# Patient Record
Sex: MALE | Race: WHITE | Employment: FULL TIME | URBAN - NONMETROPOLITAN AREA
[De-identification: names, ages, dates, MRNs, and addresses within clinical notes are randomized per-mention and may not be internally consistent; named-entity substitution may affect disease eponyms.]

---

## 2019-02-03 VITALS
OXYGEN SATURATION: 99 % | WEIGHT: 142 LBS | RESPIRATION RATE: 18 BRPM | HEIGHT: 71 IN | BODY MASS INDEX: 19.88 KG/M2 | SYSTOLIC BLOOD PRESSURE: 125 MMHG | DIASTOLIC BLOOD PRESSURE: 75 MMHG | TEMPERATURE: 97.9 F | HEART RATE: 68 BPM

## 2019-02-03 ASSESSMENT — PAIN DESCRIPTION - ORIENTATION: ORIENTATION: MID

## 2019-02-03 ASSESSMENT — PAIN SCALES - GENERAL: PAINLEVEL_OUTOF10: 10

## 2019-02-03 ASSESSMENT — PAIN DESCRIPTION - LOCATION: LOCATION: PELVIS

## 2019-02-04 ENCOUNTER — HOSPITAL ENCOUNTER (EMERGENCY)
Age: 39
Discharge: HOME | End: 2019-02-04

## 2019-02-04 DIAGNOSIS — L73.1 INGROWN HAIR: ICD-10-CM

## 2019-02-04 DIAGNOSIS — L02.91 ABSCESS: Primary | ICD-10-CM

## 2019-02-04 PROCEDURE — 99282 EMERGENCY DEPT VISIT SF MDM: CPT

## 2019-02-04 PROCEDURE — 10060 I&D ABSCESS SIMPLE/SINGLE: CPT

## 2019-02-04 PROCEDURE — 99283 EMERGENCY DEPT VISIT LOW MDM: CPT | Performed by: NURSE PRACTITIONER

## 2019-02-04 RX ORDER — MUPIROCIN CALCIUM 20 MG/G
CREAM TOPICAL
Qty: 15 G | Refills: 0 | Status: SHIPPED | OUTPATIENT
Start: 2019-02-04 | End: 2019-03-06

## 2019-02-04 RX ORDER — MORPHINE SULFATE/0.9% NACL/PF 1 MG/ML
4 SYRINGE (ML) INJECTION ONCE
Status: DISCONTINUED | OUTPATIENT
Start: 2019-02-04 | End: 2019-02-04

## 2019-02-04 RX ORDER — AMOXICILLIN AND CLAVULANATE POTASSIUM 875; 125 MG/1; MG/1
1 TABLET, FILM COATED ORAL 2 TIMES DAILY
Qty: 14 TABLET | Refills: 0 | Status: SHIPPED | OUTPATIENT
Start: 2019-02-04 | End: 2019-02-04 | Stop reason: CLARIF

## 2019-02-04 RX ORDER — ONDANSETRON 4 MG/1
4 TABLET, ORALLY DISINTEGRATING ORAL ONCE
Status: DISCONTINUED | OUTPATIENT
Start: 2019-02-04 | End: 2019-02-04

## 2019-02-04 RX ORDER — SULFAMETHOXAZOLE AND TRIMETHOPRIM 800; 160 MG/1; MG/1
1 TABLET ORAL 2 TIMES DAILY
Qty: 20 TABLET | Refills: 0 | Status: SHIPPED | OUTPATIENT
Start: 2019-02-04 | End: 2019-02-14

## 2019-02-04 ASSESSMENT — ENCOUNTER SYMPTOMS
VOMITING: 0
NAUSEA: 0

## 2025-04-04 ENCOUNTER — OFFICE VISIT (OUTPATIENT)
Dept: FAMILY MEDICINE CLINIC | Facility: CLINIC | Age: 45
End: 2025-04-04

## 2025-04-04 VITALS
TEMPERATURE: 98.7 F | OXYGEN SATURATION: 98 % | BODY MASS INDEX: 22.29 KG/M2 | HEART RATE: 83 BPM | WEIGHT: 159.2 LBS | DIASTOLIC BLOOD PRESSURE: 83 MMHG | HEIGHT: 71 IN | SYSTOLIC BLOOD PRESSURE: 123 MMHG

## 2025-04-04 DIAGNOSIS — S00.86XA INSECT BITE OF OTHER PART OF HEAD, INITIAL ENCOUNTER: Primary | ICD-10-CM
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
## 2025-04-04 RX ORDER — AMOXICILLIN 500 MG/1
500 CAPSULE ORAL 2 TIMES DAILY
Qty: 20 CAPSULE | Refills: 0 | Status: SHIPPED | OUTPATIENT
Start: 2025-04-04 | End: 2025-04-14

## 2025-04-04 NOTE — PROGRESS NOTES
"Chief Complaint  Establish Care and Insect Bite (First noticed two days ago, located in right supraciliary area. Around 2.5cm diameter, not itchy, painful.)    Subjective        Olaf Ac presents to [facility]  Insect Bite  Symptoms are new.   Onset was in the past 7 days.   Symptoms occur constantly.   Symptoms have been worse since onset.   Symptoms include a fever.    Pertinent negative symptoms include no change in stool, no chest pain, no chills, no congestion, no headaches, no joint swelling (insect bite left side forehead), no myalgias, no nausea, no neck pain, no numbness, no swollen glands, no dysuria, no vertigo, no visual change, no vomiting and no weakness.   Other symptom: pain.   Aggravating factors include nothing.   Treatments tried include acetaminophen.   Improvement on treatment was mild.   Treatment and/or Medications comments include: acetaminophen/NSAIDS     History of Present Illness      Objective   Vital Signs:  /83 (BP Location: Right arm, Patient Position: Sitting, Cuff Size: Adult)   Pulse 83   Temp 98.7 °F (37.1 °C) (Infrared)   Ht 180.3 cm (70.98\")   Wt 72.2 kg (159 lb 3.2 oz)   SpO2 98%   BMI 22.21 kg/m²   Estimated body mass index is 22.21 kg/m² as calculated from the following:    Height as of this encounter: 180.3 cm (70.98\").    Weight as of this encounter: 72.2 kg (159 lb 3.2 oz).       BMI is within normal parameters. No other follow-up for BMI required.        Physical Exam   Physical Exam      Result Review :          Results             Assessment and Plan     Diagnoses and all orders for this visit:    1. Insect bite of other part of head, initial encounter (Primary)  -     amoxicillin (AMOXIL) 500 MG capsule; Take 1 capsule by mouth 2 (Two) Times a Day for 10 days.  Dispense: 20 capsule; Refill: 0  -     warm compresses to affected area  -     may take tylenol/Ibuprofen as needed as directed   Assessment & Plan        " ICD-10-CM ICD-9-CM   1. Insect bite of other part of head, initial encounter  S00.86XA 910.4    W57.XXXA E906.4                Follow Up     Return in about 1 week (around 4/11/2025), or if symptoms worsen or fail to improve.  Patient was given instructions and counseling regarding his condition or for health maintenance advice. Please see specific information pulled into the AVS if appropriate.       Patient or patient representative verbalized consent for the use of Ambient Listening during the visit with  Kirstie Garrido DNP, [facility] for chart documentation. 4/4/2025  13:19 CDT      Electronically signed by Kirstie Garrido DNP, [place], 04/04/25, 1:19 PM CDT.